# Patient Record
Sex: FEMALE | URBAN - METROPOLITAN AREA
[De-identification: names, ages, dates, MRNs, and addresses within clinical notes are randomized per-mention and may not be internally consistent; named-entity substitution may affect disease eponyms.]

---

## 2021-12-07 ENCOUNTER — OFFICE VISIT (OUTPATIENT)
Dept: URBAN - METROPOLITAN AREA CLINIC 81 | Facility: CLINIC | Age: 51
End: 2021-12-07
Payer: COMMERCIAL

## 2021-12-07 DIAGNOSIS — H52.4 PRESBYOPIA: Primary | ICD-10-CM

## 2021-12-07 PROCEDURE — 92310 CONTACT LENS FITTING OU: CPT | Performed by: OPTOMETRIST

## 2021-12-07 PROCEDURE — 92004 COMPRE OPH EXAM NEW PT 1/>: CPT | Performed by: OPTOMETRIST

## 2021-12-07 ASSESSMENT — INTRAOCULAR PRESSURE
OD: 15
OS: 16

## 2021-12-07 ASSESSMENT — KERATOMETRY
OS: 44.50
OD: 44.75

## 2021-12-07 ASSESSMENT — VISUAL ACUITY
OS: 20/40
OD: 20/40

## 2021-12-07 NOTE — IMPRESSION/PLAN
Impression: Presbyopia: H52.4. Plan: Dispensed Rx for glasses and contacts to patient. RTC in 1 year.

## 2022-12-08 ENCOUNTER — OFFICE VISIT (OUTPATIENT)
Dept: URBAN - METROPOLITAN AREA CLINIC 81 | Facility: CLINIC | Age: 52
End: 2022-12-08
Payer: COMMERCIAL

## 2022-12-08 DIAGNOSIS — H52.4 PRESBYOPIA: Primary | ICD-10-CM

## 2022-12-08 PROCEDURE — 92014 COMPRE OPH EXAM EST PT 1/>: CPT | Performed by: OPTOMETRIST

## 2022-12-08 PROCEDURE — 92310 CONTACT LENS FITTING OU: CPT | Performed by: OPTOMETRIST

## 2022-12-08 ASSESSMENT — VISUAL ACUITY
OS: 20/20
OD: 20/20

## 2022-12-08 ASSESSMENT — INTRAOCULAR PRESSURE
OD: 14
OS: 15

## 2022-12-08 ASSESSMENT — KERATOMETRY
OS: 43.63
OD: 44.75

## 2022-12-08 NOTE — IMPRESSION/PLAN
Impression: Presbyopia: H52.4. Plan: Discussed diagnosis with patient. Dispensed Rx for glasses and contacts to patient.

## 2023-12-11 ENCOUNTER — OFFICE VISIT (OUTPATIENT)
Dept: URBAN - METROPOLITAN AREA CLINIC 81 | Facility: CLINIC | Age: 53
End: 2023-12-11
Payer: COMMERCIAL

## 2023-12-11 DIAGNOSIS — H52.4 PRESBYOPIA: Primary | ICD-10-CM

## 2023-12-11 PROCEDURE — 92310 CONTACT LENS FITTING OU: CPT | Performed by: OPTOMETRIST

## 2023-12-11 PROCEDURE — 92014 COMPRE OPH EXAM EST PT 1/>: CPT | Performed by: OPTOMETRIST

## 2023-12-11 ASSESSMENT — VISUAL ACUITY
OS: 20/20
OD: 20/20

## 2023-12-11 ASSESSMENT — INTRAOCULAR PRESSURE
OS: 16
OD: 14

## 2024-12-12 ENCOUNTER — OFFICE VISIT (OUTPATIENT)
Dept: URBAN - METROPOLITAN AREA CLINIC 81 | Facility: CLINIC | Age: 54
End: 2024-12-12
Payer: COMMERCIAL

## 2024-12-12 DIAGNOSIS — H52.4 PRESBYOPIA: Primary | ICD-10-CM

## 2024-12-12 PROCEDURE — 92310 CONTACT LENS FITTING OU: CPT | Performed by: OPTOMETRIST

## 2024-12-12 PROCEDURE — 92014 COMPRE OPH EXAM EST PT 1/>: CPT | Performed by: OPTOMETRIST

## 2024-12-12 ASSESSMENT — VISUAL ACUITY
OD: 20/20
OS: 20/20

## 2024-12-12 ASSESSMENT — KERATOMETRY
OD: 44.63
OS: 44.50

## 2024-12-12 ASSESSMENT — INTRAOCULAR PRESSURE
OD: 13
OS: 14